# Patient Record
Sex: MALE | Race: WHITE | NOT HISPANIC OR LATINO
[De-identification: names, ages, dates, MRNs, and addresses within clinical notes are randomized per-mention and may not be internally consistent; named-entity substitution may affect disease eponyms.]

---

## 2019-02-28 ENCOUNTER — RECORD ABSTRACTING (OUTPATIENT)
Age: 64
End: 2019-02-28

## 2019-02-28 DIAGNOSIS — Z86.69 PERSONAL HISTORY OF OTHER DISEASES OF THE NERVOUS SYSTEM AND SENSE ORGANS: ICD-10-CM

## 2019-02-28 DIAGNOSIS — Z87.898 PERSONAL HISTORY OF OTHER SPECIFIED CONDITIONS: ICD-10-CM

## 2019-02-28 DIAGNOSIS — Z86.19 PERSONAL HISTORY OF OTHER INFECTIOUS AND PARASITIC DISEASES: ICD-10-CM

## 2019-02-28 DIAGNOSIS — Z82.5 FAMILY HISTORY OF ASTHMA AND OTHER CHRONIC LOWER RESPIRATORY DISEASES: ICD-10-CM

## 2019-02-28 DIAGNOSIS — G44.209 TENSION-TYPE HEADACHE, UNSPECIFIED, NOT INTRACTABLE: ICD-10-CM

## 2019-02-28 DIAGNOSIS — Z78.9 OTHER SPECIFIED HEALTH STATUS: ICD-10-CM

## 2019-02-28 DIAGNOSIS — K46.9 UNSPECIFIED ABDOMINAL HERNIA W/OUT OBSTRUCTION OR GANGRENE: ICD-10-CM

## 2019-02-28 DIAGNOSIS — I45.10 UNSPECIFIED RIGHT BUNDLE-BRANCH BLOCK: ICD-10-CM

## 2019-03-08 ENCOUNTER — APPOINTMENT (OUTPATIENT)
Dept: INTERNAL MEDICINE | Facility: CLINIC | Age: 64
End: 2019-03-08
Payer: MEDICARE

## 2019-03-08 PROCEDURE — 99214 OFFICE O/P EST MOD 30 MIN: CPT | Mod: 25

## 2019-03-08 PROCEDURE — 36415 COLL VENOUS BLD VENIPUNCTURE: CPT

## 2019-03-08 RX ORDER — LORAZEPAM 1 MG/1
1 TABLET ORAL DAILY
Refills: 0 | Status: DISCONTINUED | COMMUNITY
End: 2019-03-08

## 2019-03-08 RX ORDER — MIRTAZAPINE 15 MG/1
15 TABLET, FILM COATED ORAL
Refills: 0 | Status: DISCONTINUED | COMMUNITY
End: 2019-03-08

## 2019-03-08 RX ORDER — RANITIDINE HYDROCHLORIDE 150 MG/1
150 CAPSULE ORAL TWICE DAILY
Refills: 0 | Status: DISCONTINUED | COMMUNITY
End: 2019-03-08

## 2019-03-08 RX ORDER — OMEPRAZOLE 20 MG/1
20 CAPSULE, DELAYED RELEASE ORAL
Refills: 0 | Status: ACTIVE | COMMUNITY

## 2019-03-08 NOTE — HISTORY OF PRESENT ILLNESS
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 Seeing cardio ([facility]) and goes to [facility] to work out

## 2019-03-09 LAB
CHOLEST SERPL-MCNC: 117 MG/DL
CHOLEST/HDLC SERPL: 2.5 RATIO
CRP SERPL-MCNC: <0.1 MG/DL
ERYTHROCYTE [SEDIMENTATION RATE] IN BLOOD BY WESTERGREN METHOD: 17 MM/HR
FOLATE SERPL-MCNC: >20 NG/ML
HDLC SERPL-MCNC: 46 MG/DL
LDLC SERPL CALC-MCNC: 58 MG/DL
TRIGL SERPL-MCNC: 66 MG/DL
TSH SERPL-ACNC: 1.47 UIU/ML
VIT B12 SERPL-MCNC: 490 PG/ML

## 2019-03-12 LAB
HOMOCYSTEINE LEVEL: 5.6 UMOL/L
METHYLMALONIC ACID LEVEL: 235 NMOL/L

## 2019-03-13 ENCOUNTER — TRANSCRIPTION ENCOUNTER (OUTPATIENT)
Age: 64
End: 2019-03-13

## 2019-07-12 ENCOUNTER — APPOINTMENT (OUTPATIENT)
Dept: INTERNAL MEDICINE | Facility: CLINIC | Age: 64
End: 2019-07-12
Payer: MEDICARE

## 2019-07-12 VITALS
WEIGHT: 143 LBS | DIASTOLIC BLOOD PRESSURE: 70 MMHG | TEMPERATURE: 98.7 F | SYSTOLIC BLOOD PRESSURE: 110 MMHG | RESPIRATION RATE: 16 BRPM | HEIGHT: 67 IN | BODY MASS INDEX: 22.44 KG/M2

## 2019-07-12 PROCEDURE — 99213 OFFICE O/P EST LOW 20 MIN: CPT

## 2019-07-12 RX ORDER — VENLAFAXINE HYDROCHLORIDE 37.5 MG/1
37.5 CAPSULE, EXTENDED RELEASE ORAL
Refills: 0 | Status: DISCONTINUED | COMMUNITY
End: 2019-07-12

## 2019-07-12 NOTE — PHYSICAL EXAM
[Normal] : soft, non-tender, non-distended, no masses palpated, no HSM and normal bowel sounds [de-identified] : anxious

## 2019-07-12 NOTE — HISTORY OF PRESENT ILLNESS
[FreeTextEntry1] : nausea [de-identified] : several months of nausea--seeing gastro and neuro--anxious--was on effexor which didn’t help--tried remeron without benefit 12/18--wants to try lexapro--some wt loss--ct a+p neg 11/18

## 2019-08-13 ENCOUNTER — TRANSCRIPTION ENCOUNTER (OUTPATIENT)
Age: 64
End: 2019-08-13

## 2019-08-13 ENCOUNTER — RX RENEWAL (OUTPATIENT)
Age: 64
End: 2019-08-13

## 2019-09-03 ENCOUNTER — APPOINTMENT (OUTPATIENT)
Dept: INTERNAL MEDICINE | Facility: CLINIC | Age: 64
End: 2019-09-03
Payer: MEDICARE

## 2019-09-03 VITALS
HEIGHT: 67 IN | WEIGHT: 153 LBS | SYSTOLIC BLOOD PRESSURE: 110 MMHG | DIASTOLIC BLOOD PRESSURE: 70 MMHG | RESPIRATION RATE: 16 BRPM | BODY MASS INDEX: 24.01 KG/M2

## 2019-09-03 DIAGNOSIS — I10 ESSENTIAL (PRIMARY) HYPERTENSION: ICD-10-CM

## 2019-09-03 PROCEDURE — 36415 COLL VENOUS BLD VENIPUNCTURE: CPT

## 2019-09-03 PROCEDURE — G0444 DEPRESSION SCREEN ANNUAL: CPT | Mod: 59

## 2019-09-03 PROCEDURE — 99214 OFFICE O/P EST MOD 30 MIN: CPT | Mod: 25

## 2019-09-03 RX ORDER — ATORVASTATIN CALCIUM 20 MG/1
20 TABLET, FILM COATED ORAL
Refills: 0 | Status: ACTIVE | COMMUNITY

## 2019-09-03 RX ORDER — METOPROLOL TARTRATE 25 MG/1
25 TABLET, FILM COATED ORAL DAILY
Refills: 0 | Status: DISCONTINUED | COMMUNITY
End: 2019-09-03

## 2019-09-03 NOTE — PHYSICAL EXAM
[Normal] : normal gait, coordination grossly intact, no focal deficits and deep tendon reflexes were 2+ and symmetric [de-identified] : anxious

## 2019-09-03 NOTE — HISTORY OF PRESENT ILLNESS
[FreeTextEntry1] : Headache and anxiety [de-identified] : lexapro dose increase stilll hasn’t helped anxiety--tapering up topamax--reveiwed neuro consult--has constant 3/10 frontal /maxdiscomfrot in sinus area--unclear if he is depressed--had recent labs ss heart but request f/u labs here--wt stable

## 2019-09-03 NOTE — HEALTH RISK ASSESSMENT
[2] : 1) Little interest or pleasure doing things for more than half of the days (2) [0] : 2) Feeling down, depressed, or hopeless: Not at all (0) [[id number]] : 2

## 2019-09-03 NOTE — ASSESSMENT
[FreeTextEntry1] : psychiatry eval--as suggested by neuro\par consider increasing lexapro to 30mg in 10-14 days\par check labs/[facility] (pt request)--sinus xray\par

## 2019-09-04 LAB
25(OH)D3 SERPL-MCNC: 33.6 NG/ML
APPEARANCE: CLEAR
BACTERIA: NEGATIVE
BILIRUBIN URINE: NEGATIVE
BLOOD URINE: NEGATIVE
COLOR: NORMAL
GLUCOSE QUALITATIVE U: NEGATIVE
HYALINE CASTS: 0 /LPF
KETONES URINE: NEGATIVE
LEUKOCYTE ESTERASE URINE: NEGATIVE
MAGNESIUM SERPL-MCNC: 2.4 MG/DL
MICROSCOPIC-UA: NORMAL
NITRITE URINE: NEGATIVE
PH URINE: 6
PROTEIN URINE: NEGATIVE
RED BLOOD CELLS URINE: 0 /HPF
SPECIFIC GRAVITY URINE: 1.01
SQUAMOUS EPITHELIAL CELLS: 0 /HPF
UROBILINOGEN URINE: NORMAL
WHITE BLOOD CELLS URINE: 1 /HPF

## 2019-09-25 ENCOUNTER — APPOINTMENT (OUTPATIENT)
Dept: INTERNAL MEDICINE | Facility: CLINIC | Age: 64
End: 2019-09-25
Payer: MEDICARE

## 2019-09-25 VITALS
WEIGHT: 153.56 LBS | DIASTOLIC BLOOD PRESSURE: 80 MMHG | BODY MASS INDEX: 24.1 KG/M2 | OXYGEN SATURATION: 98 % | HEART RATE: 61 BPM | TEMPERATURE: 98.2 F | HEIGHT: 67 IN | SYSTOLIC BLOOD PRESSURE: 132 MMHG

## 2019-09-25 DIAGNOSIS — G43.909 MIGRAINE, UNSPECIFIED, NOT INTRACTABLE, W/OUT STATUS MIGRAINOSUS: ICD-10-CM

## 2019-09-25 PROCEDURE — 82270 OCCULT BLOOD FECES: CPT

## 2019-09-25 PROCEDURE — 99213 OFFICE O/P EST LOW 20 MIN: CPT | Mod: 25

## 2019-09-25 NOTE — HISTORY OF PRESENT ILLNESS
[FreeTextEntry1] : HA [de-identified] : persistent frontal HA/pressure for 1 yr.--only on topamax and increased dose of lexapro for 2-3 wks--has neuro f/u--hyperaccussis?--requests prostate check

## 2019-09-25 NOTE — PHYSICAL EXAM
[Normal] : normal rate, regular rhythm, normal S1 and S2 and no murmur heard [Normal Sphincter Tone] : normal sphincter tone [No Mass] : no mass [Prostate Enlargement] : the prostate was not enlarged [Stool Occult Blood] : stool negative for occult blood

## 2019-10-14 ENCOUNTER — TRANSCRIPTION ENCOUNTER (OUTPATIENT)
Age: 64
End: 2019-10-14

## 2019-11-11 ENCOUNTER — APPOINTMENT (OUTPATIENT)
Dept: INTERNAL MEDICINE | Facility: CLINIC | Age: 64
End: 2019-11-11
Payer: MEDICARE

## 2019-11-11 VITALS
HEART RATE: 60 BPM | WEIGHT: 155.1 LBS | RESPIRATION RATE: 16 BRPM | HEIGHT: 67 IN | SYSTOLIC BLOOD PRESSURE: 120 MMHG | OXYGEN SATURATION: 99 % | DIASTOLIC BLOOD PRESSURE: 70 MMHG | BODY MASS INDEX: 24.34 KG/M2 | TEMPERATURE: 98 F

## 2019-11-11 DIAGNOSIS — E78.5 HYPERLIPIDEMIA, UNSPECIFIED: ICD-10-CM

## 2019-11-11 PROCEDURE — 99213 OFFICE O/P EST LOW 20 MIN: CPT

## 2019-11-11 NOTE — HISTORY OF PRESENT ILLNESS
[FreeTextEntry1] : f/u facial pressure [de-identified] : Had ct sinuses from ENT which apparently showed min disease. --Still some facial pressure--no diff being off topamax--pt doesn’t want to goto HA specialty center--HA is 4/10 and doesn’t interfere with activities-wants trial off lexapro

## 2019-11-11 NOTE — ASSESSMENT
[FreeTextEntry1] : results sinus ct\par consider HA specialty eval\par taper lexapro and observe--given taper schedule over sev wks

## 2019-12-12 ENCOUNTER — MEDICATION RENEWAL (OUTPATIENT)
Age: 64
End: 2019-12-12

## 2019-12-12 RX ORDER — ESCITALOPRAM OXALATE 20 MG/1
20 TABLET ORAL
Qty: 30 | Refills: 0 | Status: ACTIVE | COMMUNITY
Start: 2019-07-12 | End: 1900-01-01

## 2020-02-13 ENCOUNTER — APPOINTMENT (OUTPATIENT)
Dept: INTERNAL MEDICINE | Facility: CLINIC | Age: 65
End: 2020-02-13
Payer: MEDICARE

## 2020-02-13 VITALS
WEIGHT: 159 LBS | DIASTOLIC BLOOD PRESSURE: 81 MMHG | HEIGHT: 67 IN | OXYGEN SATURATION: 98 % | SYSTOLIC BLOOD PRESSURE: 130 MMHG | RESPIRATION RATE: 16 BRPM | TEMPERATURE: 97.4 F | BODY MASS INDEX: 24.96 KG/M2 | HEART RATE: 62 BPM

## 2020-02-13 DIAGNOSIS — R51 HEADACHE: ICD-10-CM

## 2020-02-13 DIAGNOSIS — F41.9 ANXIETY DISORDER, UNSPECIFIED: ICD-10-CM

## 2020-02-13 DIAGNOSIS — M54.9 DORSALGIA, UNSPECIFIED: ICD-10-CM

## 2020-02-13 PROCEDURE — 99213 OFFICE O/P EST LOW 20 MIN: CPT

## 2020-02-13 NOTE — PHYSICAL EXAM
[No Focal Deficits] : no focal deficits [de-identified] : local left paralumbar discomfort [Normal] : normal gait, coordination grossly intact, no focal deficits and deep tendon reflexes were 2+ and symmetric

## 2020-02-13 NOTE — ASSESSMENT
[FreeTextEntry1] : can taper lexapro to off and observe--pt wants to come off med\par l-s spine xry\par cont with chiro

## 2020-02-13 NOTE — HISTORY OF PRESENT ILLNESS
[FreeTextEntry8] : Developed L>R low back pain 3 wks ago--no injury\par No focal neurologic symps--saw chiro\par stable HA syndrome

## 2020-12-08 ENCOUNTER — APPOINTMENT (OUTPATIENT)
Dept: MRI IMAGING | Facility: CLINIC | Age: 65
End: 2020-12-08
Payer: MEDICARE

## 2020-12-08 ENCOUNTER — OUTPATIENT (OUTPATIENT)
Dept: OUTPATIENT SERVICES | Facility: HOSPITAL | Age: 65
LOS: 1 days | End: 2020-12-08
Payer: MEDICARE

## 2020-12-08 DIAGNOSIS — Z00.8 ENCOUNTER FOR OTHER GENERAL EXAMINATION: ICD-10-CM

## 2020-12-08 PROCEDURE — 73720 MRI LWR EXTREMITY W/O&W/DYE: CPT | Mod: 26,LT

## 2020-12-08 PROCEDURE — 73720 MRI LWR EXTREMITY W/O&W/DYE: CPT
